# Patient Record
Sex: FEMALE | Race: WHITE | URBAN - METROPOLITAN AREA
[De-identification: names, ages, dates, MRNs, and addresses within clinical notes are randomized per-mention and may not be internally consistent; named-entity substitution may affect disease eponyms.]

---

## 2017-11-13 ENCOUNTER — OFFICE VISIT (OUTPATIENT)
Dept: FAMILY MEDICINE CLINIC | Age: 22
End: 2017-11-13

## 2017-11-13 VITALS
HEART RATE: 77 BPM | WEIGHT: 150.2 LBS | RESPIRATION RATE: 18 BRPM | TEMPERATURE: 97.2 F | SYSTOLIC BLOOD PRESSURE: 119 MMHG | OXYGEN SATURATION: 99 % | DIASTOLIC BLOOD PRESSURE: 76 MMHG | BODY MASS INDEX: 25.64 KG/M2 | HEIGHT: 64 IN

## 2017-11-13 DIAGNOSIS — Z23 ENCOUNTER FOR IMMUNIZATION: ICD-10-CM

## 2017-11-13 DIAGNOSIS — Z32.00 ENCOUNTER FOR PREGNANCY TEST, RESULT UNKNOWN: Primary | ICD-10-CM

## 2017-11-13 DIAGNOSIS — J45.40 MODERATE PERSISTENT ASTHMA WITHOUT COMPLICATION: ICD-10-CM

## 2017-11-13 DIAGNOSIS — Z30.011 BCP (BIRTH CONTROL PILLS) INITIATION: ICD-10-CM

## 2017-11-13 PROBLEM — J45.909 MODERATE ASTHMA WITHOUT COMPLICATION: Status: ACTIVE | Noted: 2017-11-13

## 2017-11-13 LAB
HCG URINE, QL. (POC): NEGATIVE
VALID INTERNAL CONTROL?: YES

## 2017-11-13 RX ORDER — NORGESTIMATE AND ETHINYL ESTRADIOL 7DAYSX3 28
KIT ORAL
COMMUNITY
End: 2017-12-19 | Stop reason: SDUPTHER

## 2017-11-13 RX ORDER — ALBUTEROL SULFATE 90 UG/1
1 AEROSOL, METERED RESPIRATORY (INHALATION)
Qty: 1 INHALER | Refills: 2 | Status: SHIPPED | OUTPATIENT
Start: 2017-11-13

## 2017-11-13 RX ORDER — ALBUTEROL SULFATE 90 UG/1
AEROSOL, METERED RESPIRATORY (INHALATION)
COMMUNITY
End: 2017-11-13 | Stop reason: SDUPTHER

## 2017-11-13 NOTE — PROGRESS NOTES
1. Have you been to the ER, urgent care clinic since your last visit? Hospitalized since your last visit? No    2. Have you seen or consulted any other health care providers outside of the [address] [place] [address] since your last visit? Include any pap smears or colon screening. No     Patient here today to get a refill on her medication.

## 2017-11-13 NOTE — PROGRESS NOTES
Chief Complaint   Patient presents with    Establish Care       HPI:  Pt is a 25y.o. year old female who presents to establish care with a primary care provider.      ROS:  · General: negative for - chills, fever, weight changes or malaise Positive:  · Skin, hair, nails:  negative for rashes, lumps, sores, itching, dryness, color changes, changes in hair or nails,easy bruising or bleeding Positive:  · Head:  negative for head trauma, headaches, vertigo or confusion Positive:  · Eyes:  negative for pain, redness, excessive tearing diplopia and blurred vision, Positive:  corrective lenses  · Ears:  negative for pain, hearing changes, infection, discharge Positive:  · Nose/throat:  negative for sore throat, nasal congestion, bleeding gums, sore tongue, dry mouth, vocal changes, neck lumps, neck pain or stiffness Positive:  · Respiratory:  negative for cough, shortness of breath, or wheezing, denies hemoptysis, pleuritic pain, changes in stamina Positive: hx of asthma  · Cardiovascular:  negative for chest pain, palpitations, or dyspnea on exertion; no orthopnea or PND, edema Positive:  · Gastrointestinal: negative for abdominal pain, N/V, change in bowel habits,  black or bloody stools, denies changes in appetitie, jaundice, heartburn Positive:  · Musculoskeletal: negative for back pain, joint pain, joint stiffness, muscle pain or muscle weakness Positive:  · Neurological:  negative for numbness, tingling, headache or dizziness, seizures, tremors, involuntary movements, memory loss Positive:  · Psychological: negative for - anxiety, depression, sleep disturbances, suicidal or homicidal ideations Positive:  · Urinary:  negative for frequency, urgency, dysuria, hematuria, incontinence, nocturia Positive:  · Endocrine:  negative for heat or cold intolerance, excessive sweating, polydipsia, polyphagia, polyuria Positive:  · Female reproductive: denies problems      Patient Active Problem List   Diagnosis Code    Moderate asthma without complication Z57.830    Encounter for immunization Z23       Past Medical History:   Diagnosis Date    Asthma           Social History     Social History    Marital status: SINGLE     Spouse name: N/A    Number of children: N/A    Years of education: N/A     Occupational History    Not on file. Social History Main Topics    Smoking status: Never Smoker    Smokeless tobacco: Never Used    Alcohol use 3.6 oz/week     3 Glasses of wine, 3 Shots of liquor per week    Drug use: No    Sexual activity: Yes     Partners: Male     Birth control/ protection: Pill, Condom     Other Topics Concern    Not on file     Social History Narrative    No narrative on file       Current Outpatient Prescriptions   Medication Sig Dispense Refill    norgestimate-ethinyl estradiol (TRINESSA, 28,) 0.18/0.215/0.25 mg-35 mcg (28) tab Take  by mouth.  albuterol (PROVENTIL HFA, VENTOLIN HFA, PROAIR HFA) 90 mcg/actuation inhaler Take 1 Puff by inhalation every four (4) hours as needed for Wheezing. 1 Inhaler 2       History   Smoking Status    Never Smoker   Smokeless Tobacco    Never Used       Allergies   Allergen Reactions    Other Food Angioedema     mesquite       Patient Labs were reviewed: n/a         Patient Past Records were reviewed:  yes        Objective:     Vitals:    11/13/17 1124   BP: 119/76   Pulse: 77   Resp: 18   Temp: 97.2 °F (36.2 °C)   TempSrc: Oral   SpO2: 99%   Weight: 150 lb 3.2 oz (68.1 kg)   Height: 5' 4\" (1.626 m)        Body mass index is 25.78 kg/(m^2). Exam:   Appearance: alert, well appearing,  oriented to person, place, and time, acyanotic, in no respiratory distress and well hydrated, neatly groomed, dressed appropriately for situation and weather, credible source of information, no  used  Head: Normocephalic with no masses, protrusions, depressions, lesions, itching, scaling. Facial features symmetric. No drooping, weakness or involuntary movements.  Skin texture smooth. No edema. No palpable nodes  Neck: Symmetrical accessory muscles, trachea midline, head held erect. Thyroid without masses, no cervical lymphadenopathy, no JVD, no carotid bruit  Eye:  no nystagmus or lid lag, no drainage, no redness, no masses,  Anicteric sclera , no ptosis, pink conjunctiva, PERRLA    Ear:  exhibited no signs of hearing loss, bilateral auricles equal in size and symmetric, no redness or drainage, Otoscopic examination unremarkable  Nose:  Nose midline and proportionate to pt facial features. No lésions or skin changes, no drainage  Mouth: no halitosis, mucous membranes pink and moist, no sores or lesions  Cardiac:  no cyanosis or venous congestion, no murmurs, rubs or gallops, no peripheral edema, RRR, capillary refill <= 3 seconds  Lungs:  lungs clear bilaterally all lobes, no SOB, chest rise symmetric, good air exchange, no nail bed clubbing  Abdomen: abd soft,  non-tender upon palpation to all quads, no hernias, no masses or deformities, positive bowel sounds, no hepatosplenomegaly   Skin: dry, warm, intact, no palor, acyanotic, color consistent to ethnic background, no jaundice  M/S: uninhibited ROM to all joints, no crepitus  Neuro: no lateralizing signs, CNs II-XII intact      Assessment/ Plan:   Diagnoses and all orders for this visit:    1. Encounter for pregnancy test, result unknown  -     AMB POC URINE PREGNANCY TEST, VISUAL COLOR COMPARISON    2. Moderate persistent asthma without complication  -     albuterol (PROVENTIL HFA, VENTOLIN HFA, PROAIR HFA) 90 mcg/actuation inhaler; Take 1 Puff by inhalation every four (4) hours as needed for Wheezing. 3. Encounter for immunization  -     Influenza virus vaccine (QUADRIVALENT PRES FREE SYRINGE) IM (21376)    4. BCP (birth control pills) initiation  -     [facility]      Follow-up Disposition:  Return if symptoms worsen or fail to improve.         I have discussed the diagnosis with the patient and the intended plan as seen in the above orders. The patient has received an After-Visit Summary and questions were answered concerning future plans. Medication Side Effects and Warnings were discussed with patient: n/a    Patient verbalized understanding of above instructions.     José Luis Blount [facility]  Internal Medicine  [facility]

## 2017-11-13 NOTE — PATIENT INSTRUCTIONS
Vaccine Information Statement    Influenza (Flu) Vaccine (Inactivated or Recombinant): What you need to know    Many Vaccine Information Statements are available in [place]ian and other languages. See www.immunize.org/vis  Hojas de Información Sobre Vacunas están disponibles en Español y en muchos otros idiomas. Visite www.immunize.org/vis    1. Why get vaccinated? Influenza (flu) is a contagious disease that spreads around the [place] every year, usually between October and May. Flu is caused by influenza viruses, and is spread mainly by coughing, sneezing, and close contact. Anyone can get flu. Flu strikes suddenly and can last several days. Symptoms vary by age, but can include:   fever/chills   sore throat   muscle aches   fatigue   cough   headache    runny or stuffy nose    Flu can also lead to pneumonia and blood infections, and cause diarrhea and seizures in children. If you have a medical condition, such as heart or lung disease, flu can make it worse. Flu is more dangerous for some people. Infants and young children, people 72years of age and older, pregnant women, and people with certain health conditions or a weakened immune system are at greatest risk. Each year thousands of people in the [facility] die from flu, and many more are hospitalized. Flu vaccine can:   keep you from getting flu,   make flu less severe if you do get it, and   keep you from spreading flu to your family and other people. 2. Inactivated and recombinant flu vaccines    A dose of flu vaccine is recommended every flu season. Children 6 months through 6years of age may need two doses during the same flu season. Everyone else needs only one dose each flu season.        Some inactivated flu vaccines contain a very small amount of a mercury-based preservative called thimerosal. Studies have not shown thimerosal in vaccines to be harmful, but flu vaccines that do not contain thimerosal are available. There is no live flu virus in flu shots. They cannot cause the flu. There are many flu viruses, and they are always changing. Each year a new flu vaccine is made to protect against three or four viruses that are likely to cause disease in the upcoming flu season. But even when the vaccine doesnt exactly match these viruses, it may still provide some protection    Flu vaccine cannot prevent:   flu that is caused by a virus not covered by the vaccine, or   illnesses that look like flu but are not. It takes about 2 weeks for protection to develop after vaccination, and protection lasts through the flu season. 3. Some people should not get this vaccine    Tell the person who is giving you the vaccine:     If you have any severe, life-threatening allergies. If you ever had a life-threatening allergic reaction after a dose of flu vaccine, or have a severe allergy to any part of this vaccine, you may be advised not to get vaccinated. Most, but not all, types of flu vaccine contain a small amount of egg protein.  If you ever had Guillain-Barré Syndrome (also called GBS). Some people with a history of GBS should not get this vaccine. This should be discussed with your doctor.  If you are not feeling well. It is usually okay to get flu vaccine when you have a mild illness, but you might be asked to come back when you feel better. 4. Risks of a vaccine reaction    With any medicine, including vaccines, there is a chance of reactions. These are usually mild and go away on their own, but serious reactions are also possible. Most people who get a flu shot do not have any problems with it.      Minor problems following a flu shot include:    soreness, redness, or swelling where the shot was given     hoarseness   sore, red or itchy eyes   cough   fever   aches   headache   itching   fatigue  If these problems occur, they usually begin soon after the shot and last 1 or 2 days. More serious problems following a flu shot can include the following:     There may be a small increased risk of Guillain-Barré Syndrome (GBS) after inactivated flu vaccine. This risk has been estimated at 1 or 2 additional cases per million people vaccinated. This is much lower than the risk of severe complications from flu, which can be prevented by flu vaccine.  Young children who get the flu shot along with pneumococcal vaccine (PCV13) and/or DTaP vaccine at the same time might be slightly more likely to have a seizure caused by fever. Ask your doctor for more information. Tell your doctor if a child who is getting flu vaccine has ever had a seizure. Problems that could happen after any injected vaccine:      People sometimes faint after a medical procedure, including vaccination. Sitting or lying down for about 15 minutes can help prevent fainting, and injuries caused by a fall. Tell your doctor if you feel dizzy, or have vision changes or ringing in the ears.  Some people get severe pain in the shoulder and have difficulty moving the arm where a shot was given. This happens very rarely.  Any medication can cause a severe allergic reaction. Such reactions from a vaccine are very rare, estimated at about 1 in a million doses, and would happen within a few minutes to a few hours after the vaccination. As with any medicine, there is a very remote chance of a vaccine causing a serious injury or death. The safety of vaccines is always being monitored. For more information, visit: www.cdc.gov/vaccinesafety/    5. What if there is a serious reaction? What should I look for?  Look for anything that concerns you, such as signs of a severe allergic reaction, very high fever, or unusual behavior.     Signs of a severe allergic reaction can include hives, swelling of the face and throat, difficulty breathing, a fast heartbeat, dizziness, and weakness - usually within a few minutes to a few hours after the vaccination. What should I do?  If you think it is a severe allergic reaction or other emergency that cant wait, call 9-1-1 and get the person to the nearest hospital. Otherwise, call your doctor.  Reactions should be reported to the Vaccine Adverse Event Reporting System (VAERS). Your doctor should file this report, or you can do it yourself through  the VAERS web site at www.vaers. [facility].gov, or by calling 3-725.976.8832. VAERS does not give medical advice. 6. The National Vaccine Injury Compensation Program    The [facility] Vaccine Injury Compensation Program (VICP) is a federal program that was created to compensate people who may have been injured by certain vaccines. Persons who believe they may have been injured by a vaccine can learn about the program and about filing a claim by calling 8-587.134.6137 or visiting the [employer] website at www.[facility].gov/vaccinecompensation. There is a time limit to file a claim for compensation. 7. How can I learn more?  Ask your healthcare provider. He or she can give you the vaccine package insert or suggest other sources of information.  Call your local or state health department.  Contact the Centers for Disease Control and Prevention (CDC):  - Call 9-904.870.5665 (1-800-CDC-INFO) or  - Visit CDCs website at www.cdc.gov/flu    Vaccine Information Statement   Inactivated Influenza Vaccine   8/7/2015  [address] Escobar [id number]    Department of Health and Human Services  Centers for Disease Control and Prevention    Office Use Only

## 2017-11-13 NOTE — MR AVS SNAPSHOT
Visit Information Date & Time Provider Department Dept. Phone Encounter #  
 11/13/2017 11:00 AM Magdalena Sapp, [facility] [phone] [id number] Follow-up Instructions Return if symptoms worsen or fail to improve. Your Appointments 11/30/2017  1:00 PM  
New Patient with Carmella Santos MD  
[address]  ([address]) Appt Note: NP - consultation with birth control id ins card 30 mins early rb 11/13/17  
 [address] [address]  
[phone]  
  
   
 [address] Upcoming Health Maintenance Date Due  
 HPV AGE 9Y-34Y (1 of 3 - Female 3 Dose Series) 6/19/2006 DTaP/Tdap/Td series (1 - Tdap) 6/19/2016 PAP AKA CERVICAL CYTOLOGY 6/19/2016 Influenza Age 5 to Adult 8/1/2017 Allergies as of 11/13/2017  Review Complete On: 11/13/2017 By: Magdalena Sapp NP Severity Noted Reaction Type Reactions Other Food High 11/13/2017    Angioedema  
 mesquite Current Immunizations  Never Reviewed Name Date Influenza Vaccine (Quad) PF  Incomplete Not reviewed this visit You Were Diagnosed With   
  
 Codes Comments Encounter for pregnancy test, result unknown    -  Primary ICD-10-CM: Z32.00 ICD-9-CM: V72.40 Moderate persistent asthma without complication     TFX-37-OK: J45.40 ICD-9-CM: 493.90 Encounter for immunization     ICD-10-CM: R68 ICD-9-CM: V03.89 Vitals BP Pulse Temp Resp Height(growth percentile) Weight(growth percentile) 119/76 77 97.2 °F (36.2 °C) (Oral) 18 5' 4\" (1.626 m) 150 lb 3.2 oz (68.1 kg) LMP SpO2 BMI Smoking Status 10/13/2017 99% 25.78 kg/m2 Never Smoker Vitals History BMI and BSA Data Body Mass Index Body Surface Area 25.78 kg/m 2 1.75 m 2 Preferred Pharmacy Pharmacy Name Phone  [id number] [place], [place] - [address] [phone] Your Updated Medication List  
  
   
This list is accurate as of: 11/13/17 12:13 PM.  Always use your most recent med list.  
  
  
  
  
 albuterol 90 mcg/actuation inhaler Commonly known as:  PROVENTIL HFA, VENTOLIN HFA, PROAIR HFA Take 1 Puff by inhalation every four (4) hours as needed for Wheezing. TRINESSA (28) 0.18/0.215/0.25 mg-35 mcg (28) Tab Generic drug:  norgestimate-ethinyl estradiol Take  by mouth. Prescriptions Sent to Pharmacy Refills  
 albuterol (PROVENTIL HFA, VENTOLIN HFA, PROAIR HFA) 90 mcg/actuation inhaler 2 Sig: Take 1 Puff by inhalation every four (4) hours as needed for Wheezing. Class: Normal  
 Pharmacy: [facility]/pharmacy #[id number].  #: [phone] Route: Inhalation We Performed the Following AMB POC URINE PREGNANCY TEST, VISUAL COLOR COMPARISON [66664 CPT(R)] INFLUENZA VIRUS VAC QUAD,SPLIT,PRESV FREE SYRINGE IM [id number] CPT(R)] Follow-up Instructions Return if symptoms worsen or fail to improve. Patient Instructions Vaccine Information Statement Influenza (Flu) Vaccine (Inactivated or Recombinant): What you need to know Many Vaccine Information Statements are available in Lithuanian and other languages. See www.immunize.org/vis Hojas de Información Sobre Vacunas están disponibles en Español y en muchos otros idiomas. Visite www.immunize.org/vis 1. Why get vaccinated? Influenza (flu) is a contagious disease that spreads around the [place] every year, usually between October and May. Flu is caused by influenza viruses, and is spread mainly by coughing, sneezing, and close contact. Anyone can get flu. Flu strikes suddenly and can last several days. Symptoms vary by age, but can include: 
 fever/chills  sore throat  muscle aches  fatigue  cough  headache  runny or stuffy nose Flu can also lead to pneumonia and blood infections, and cause diarrhea and seizures in children. If you have a medical condition, such as heart or lung disease, flu can make it worse. Flu is more dangerous for some people. Infants and young children, people 72years of age and older, pregnant women, and people with certain health conditions or a weakened immune system are at greatest risk. Each year thousands of people in the [facility] die from flu, and many more are hospitalized. Flu vaccine can: 
 keep you from getting flu, 
 make flu less severe if you do get it, and 
 keep you from spreading flu to your family and other people. 2. Inactivated and recombinant flu vaccines A dose of flu vaccine is recommended every flu season. Children 6 months through 6years of age may need two doses during the same flu season. Everyone else needs only one dose each flu season. Some inactivated flu vaccines contain a very small amount of a mercury-based preservative called thimerosal. Studies have not shown thimerosal in vaccines to be harmful, but flu vaccines that do not contain thimerosal are available. There is no live flu virus in flu shots. They cannot cause the flu. There are many flu viruses, and they are always changing. Each year a new flu vaccine is made to protect against three or four viruses that are likely to cause disease in the upcoming flu season. But even when the vaccine doesnt exactly match these viruses, it may still provide some protection Flu vaccine cannot prevent: 
 flu that is caused by a virus not covered by the vaccine, or 
 illnesses that look like flu but are not. It takes about 2 weeks for protection to develop after vaccination, and protection lasts through the flu season. 3. Some people should not get this vaccine Tell the person who is giving you the vaccine:  If you have any severe, life-threatening allergies. If you ever had a life-threatening allergic reaction after a dose of flu vaccine, or have a severe allergy to any part of this vaccine, you may be advised not to get vaccinated. Most, but not all, types of flu vaccine contain a small amount of egg protein.  If you ever had Guillain-Barré Syndrome (also called GBS). Some people with a history of GBS should not get this vaccine. This should be discussed with your doctor.  If you are not feeling well. It is usually okay to get flu vaccine when you have a mild illness, but you might be asked to come back when you feel better. 4. Risks of a vaccine reaction With any medicine, including vaccines, there is a chance of reactions. These are usually mild and go away on their own, but serious reactions are also possible. Most people who get a flu shot do not have any problems with it. Minor problems following a flu shot include:  
 soreness, redness, or swelling where the shot was given  hoarseness  sore, red or itchy eyes  cough  fever  aches  headache  itching  fatigue If these problems occur, they usually begin soon after the shot and last 1 or 2 days. More serious problems following a flu shot can include the following:  There may be a small increased risk of Guillain-Barré Syndrome (GBS) after inactivated flu vaccine. This risk has been estimated at 1 or 2 additional cases per million people vaccinated. This is much lower than the risk of severe complications from flu, which can be prevented by flu vaccine.  Young children who get the flu shot along with pneumococcal vaccine (PCV13) and/or DTaP vaccine at the same time might be slightly more likely to have a seizure caused by fever. Ask your doctor for more information. Tell your doctor if a child who is getting flu vaccine has ever had a seizure. Problems that could happen after any injected vaccine:  People sometimes faint after a medical procedure, including vaccination. Sitting or lying down for about 15 minutes can help prevent fainting, and injuries caused by a fall. Tell your doctor if you feel dizzy, or have vision changes or ringing in the ears.  Some people get severe pain in the shoulder and have difficulty moving the arm where a shot was given. This happens very rarely.  Any medication can cause a severe allergic reaction. Such reactions from a vaccine are very rare, estimated at about 1 in a million doses, and would happen within a few minutes to a few hours after the vaccination. As with any medicine, there is a very remote chance of a vaccine causing a serious injury or death. The safety of vaccines is always being monitored. For more information, visit: www.cdc.gov/vaccinesafety/ 
 
 
The [facility] Vaccine Injury Compensation Program (VICP) is a federal program that was created to compensate people who may have been injured by certain vaccines. Persons who believe they may have been injured by a vaccine can learn about the program and about filing a claim by calling 8-226.334.7223 or visiting the [employer] website at www.[facility].gov/vaccinecompensation. There is a time limit to file a claim for compensation. 7. How can I learn more?  Ask your healthcare provider. He or she can give you the vaccine package insert or suggest other sources of information.  Call your local or state health department.  Contact the Centers for Disease Control and Prevention (CDC): 
- Call 4-430.495.2425 (1-800-CDC-INFO) or 
- Visit CDCs website at www.cdc.gov/flu Vaccine Information Statement Inactivated Influenza Vaccine 8/7/2015 
42 ARCADIO Kramer 878DA-11 [facility] and [place] Centers for Disease Control and Prevention Office Use Only Introducing [facility]! [employer] introduces [employer] patient portal. Now you can access parts of your medical record, email your doctor's office, and request medication refills online. 1. In your internet browser, go to [email or web address] 2. Click on the First Time User? Click Here link in the Sign In box. You will see the New Member Sign Up page. 3. Enter your [employer] Access Code exactly as it appears below. You will not need to use this code after youve completed the sign-up process. If you do not sign up before the expiration date, you must request a new code. · [employer] Access Code: [email or web address] Expires: 2/11/2018 11:58 AM 
 
4. Enter the last four digits of your Social Security Number (xxxx) and Date of Birth (mm/dd/yyyy) as indicated and click Submit. You will be taken to the next sign-up page. 5. Create a Comuniteet ID. This will be your [employer] login ID and cannot be changed, so think of one that is secure and easy to remember. 6. Create a Com[employer] password. You can change your password at any time. 7. Enter your Password Reset Question and Answer. This can be used at a later time if you forget your password. 8. Enter your e-mail address. You will receive e-mail notification when new information is available in [address] 9. Click Sign Up. You can now view and download portions of your medical record. 10. Click the Download Summary menu link to download a portable copy of your medical information. If you have questions, please visit the Frequently Asked Questions section of the [employer] website. Remember, [employer] is NOT to be used for urgent needs. For medical emergencies, dial 911. Now available from your iPhone and Android! Please provide this summary of care documentation to your next provider. Your primary care clinician is listed as Lico Hurst. If you have any questions after today's visit, please call [phone].

## 2017-12-19 ENCOUNTER — OFFICE VISIT (OUTPATIENT)
Dept: OBGYN CLINIC | Age: 22
End: 2017-12-19

## 2017-12-19 ENCOUNTER — HOSPITAL ENCOUNTER (OUTPATIENT)
Dept: LAB | Age: 22
Discharge: HOME | End: 2017-12-19

## 2017-12-19 VITALS — HEIGHT: 64 IN | WEIGHT: 151.8 LBS | BODY MASS INDEX: 25.92 KG/M2

## 2017-12-19 DIAGNOSIS — N89.8 VAGINAL DISCHARGE: Primary | ICD-10-CM

## 2017-12-19 DIAGNOSIS — Z11.3 SCREEN FOR STD (SEXUALLY TRANSMITTED DISEASE): ICD-10-CM

## 2017-12-19 DIAGNOSIS — Z30.41 ENCOUNTER FOR SURVEILLANCE OF CONTRACEPTIVE PILLS: ICD-10-CM

## 2017-12-19 DIAGNOSIS — N76.0 BACTERIAL VAGINOSIS: ICD-10-CM

## 2017-12-19 DIAGNOSIS — B96.89 BACTERIAL VAGINOSIS: ICD-10-CM

## 2017-12-19 PROCEDURE — 99001 SPECIMEN HANDLING PT-LAB: CPT | Performed by: OBSTETRICS & GYNECOLOGY

## 2017-12-19 RX ORDER — NORGESTIMATE AND ETHINYL ESTRADIOL 7DAYSX3 28
1 KIT ORAL DAILY
Qty: 1 PACKAGE | Refills: 12 | Status: SHIPPED | OUTPATIENT
Start: 2017-12-19

## 2017-12-19 RX ORDER — METRONIDAZOLE 500 MG/1
500 TABLET ORAL 2 TIMES DAILY
Qty: 14 TAB | Refills: 0 | Status: SHIPPED | OUTPATIENT
Start: 2017-12-19 | End: 2017-12-26

## 2017-12-19 NOTE — PROGRESS NOTES
Subjective:      Chuy  is a 25 y.o. female who complains of vag d/c w odor x 2 wks, no vag itching/burning, amt of discharge increasing over time, has no alleviating or worsening factors; denies n/v/f/c; desires std screening    Sexual history: single partner, contraception - OCP (Oral Contraceptive Pills). Prior Pap smear: earlier this year, wnl.    OB History      Para Term  AB Living    0 0 0 0 0 0    SAB TAB Ectopic Molar Multiple Live Births    0 0 0 0 0 0        Patient Active Problem List   Diagnosis Code    Moderate asthma without complication O39.841    Encounter for immunization Z23     Current Outpatient Prescriptions   Medication Sig Dispense Refill    metroNIDAZOLE (FLAGYL) 500 mg tablet Take 1 Tab by mouth two (2) times a day for 7 days. 14 Tab 0    norgestimate-ethinyl estradiol (TRINESSA, 28,) 0.18/0.215/0.25 mg-35 mcg (28) tab Take 1 Tab by mouth daily. Indications: Pregnancy Contraception 1 Package 12    albuterol (PROVENTIL HFA, VENTOLIN HFA, PROAIR HFA) 90 mcg/actuation inhaler Take 1 Puff by inhalation every four (4) hours as needed for Wheezing. 1 Inhaler 2     Allergies   Allergen Reactions    Other Food Angioedema     mesquite     Past Medical History:   Diagnosis Date    Asthma      History reviewed. No pertinent surgical history.   Family History   Problem Relation Age of Onset    Hypertension Mother     High Cholesterol Father     Diabetes Maternal Grandmother     Cancer Other 80     ovarian and breast     Social History   Substance Use Topics    Smoking status: Never Smoker    Smokeless tobacco: Never Used    Alcohol use 3.6 oz/week     3 Glasses of wine, 3 Shots of liquor per week        Review of Systems:   General ROS: negative for fever, chills, malaise  Respiratory ROS: no cough, shortness of breath, or wheezing  Cardiovascular ROS: no chest pain or dyspnea on exertion  Gastrointestinal ROS: no abdominal pain, change in bowel habits, or black or bloody stools, nausea, vomiting  Genito-Urinary ROS: no dysuria, trouble voiding, incontinence or hematuria. No pelvic pain, abnormal bleeding     Objective:     Visit Vitals    Ht 5' 4\" (1.626 m)    Wt 68.9 kg (151 lb 12.8 oz)    LMP 12/03/2017 (Exact Date)    BMI 26.06 kg/m2      Physical Exam:   General appearance - alert, well appearing, and in no distress, oriented to person, place, and time  Abdomen - soft, nontender, nondistended, no masses or organomegaly  Pelvic - No inguinal lymphadenopathy, normal urethral meatus and no bladder tenderness. VULVA: normal appearing vulva with no masses, tenderness or lesions,   VAGINA: normal appearing vagina with normal color, no lesions, scant white d/c   PELVIC FLOOR EXAM: no cystocele, rectocele or prolapse noted  CERVIX: normal appearing cervix without discharge or lesions,   UTERUS: uterus is normal size, shape, consistency and nontender, mobile,   ADNEXA: normal adnexa in size, nontender and no masses. Wet prep: +clue cells, no trich, no yeast    Assessment/Plan:       ICD-10-CM ICD-9-CM    1. Vaginal discharge N89.8 623.5 AMB POC SMEAR, STAIN & INTERPRET, WET MOUNT      CHLAMYDIA/NEISSERIA/TRICHOMONAS AMP   2. Bacterial vaginosis N76.0 616.10 metroNIDAZOLE (FLAGYL) 500 mg tablet    B96.89 041. 9 CHLAMYDIA/NEISSERIA/TRICHOMONAS AMP   3. Screen for STD (sexually transmitted disease) Z11.3 V74.5 T PALLIDUM AB      HEP B SURFACE AG      HEPATITIS C AB      HIV 1/2 AG/AB, 4TH GENERATION,W RFLX CONFIRM      T PALLIDUM AB      HEP B SURFACE AG      HEPATITIS C AB      HIV 1/2 AG/AB, 4TH GENERATION,W RFLX CONFIRM      CHLAMYDIA/NEISSERIA/TRICHOMONAS AMP   4.  Encounter for surveillance of contraceptive pills Z30.41 V25.41 norgestimate-ethinyl estradiol (TRINESSA, 28,) 0.18/0.215/0.25 mg-35 mcg (28) tab      CHLAMYDIA/NEISSERIA/TRICHOMONAS AMP     lab results and schedule of future lab studies reviewed with patient     Discussed with patient that our office will call for abnormal lab results. Normal results can be reviewed through SeeMe. If patient has not received a phone call from our office or there are no results found on Mychart, the patient has been instructed to call our office to follow up on results. I have verbalized the plan of care with patient. The patient was given a full opportunity to ask questions, indicated that her questions had been answered and expressed understanding.

## 2017-12-21 LAB
C TRACH RRNA SPEC QL NAA+PROBE: NEGATIVE
HBV SURFACE AG SERPL QL IA: NEGATIVE
HCV AB S/CO SERPL IA: 0.2 S/CO RATIO (ref 0–0.9)
HIV 1+2 AB+HIV1 P24 AG SERPL QL IA: NON REACTIVE
N GONORRHOEA RRNA SPEC QL NAA+PROBE: NEGATIVE
T PALLIDUM AB SER QL IA: NEGATIVE
T VAGINALIS RRNA SPEC QL NAA+PROBE: NEGATIVE

## 2017-12-29 LAB — WET MOUNT POCT, WMPOCT: NORMAL

## 2018-03-16 ENCOUNTER — TELEPHONE (OUTPATIENT)
Dept: OBGYN CLINIC | Age: 23
End: 2018-03-16

## 2018-03-21 ENCOUNTER — OFFICE VISIT (OUTPATIENT)
Dept: OBGYN CLINIC | Age: 23
End: 2018-03-21

## 2018-03-21 VITALS
HEIGHT: 64 IN | WEIGHT: 153 LBS | HEART RATE: 81 BPM | SYSTOLIC BLOOD PRESSURE: 114 MMHG | DIASTOLIC BLOOD PRESSURE: 74 MMHG | BODY MASS INDEX: 26.12 KG/M2

## 2018-03-21 DIAGNOSIS — N76.0 BV (BACTERIAL VAGINOSIS): ICD-10-CM

## 2018-03-21 DIAGNOSIS — B96.89 BV (BACTERIAL VAGINOSIS): ICD-10-CM

## 2018-03-21 DIAGNOSIS — N89.8 VAGINAL DISCHARGE: Primary | ICD-10-CM

## 2018-03-21 RX ORDER — METRONIDAZOLE 500 MG/1
500 TABLET ORAL 2 TIMES DAILY
Qty: 14 TAB | Refills: 0 | Status: SHIPPED | OUTPATIENT
Start: 2018-03-21 | End: 2018-03-28

## 2018-03-21 NOTE — MR AVS SNAPSHOT
[address] [place] [id number] [phone] Patient: Guerrero Thurston MRN: [id number] Visit Information Date & Time Provider Department Dept. Phone Encounter #  
 3/21/2018  1:45 PM Perry Arellano Osito [facility]  Follow-up Instructions Return if symptoms worsen or fail to improve. Upcoming Health Maintenance Date Due  
 HPV AGE 9Y-34Y (1 of 3 - Female 3 Dose Series) 2006 PAP AKA CERVICAL CYTOLOGY 2016 Allergies as of 3/21/2018  Review Complete On: 3/21/2018 By: Andie Boudreaux LPN Severity Noted Reaction Type Reactions Other Food High 2017    Angioedema  
 mesquite Current Immunizations  Never Reviewed Name Date Influenza Vaccine (Quad) PF 2017 12:44 PM  
  
 Not reviewed this visit You Were Diagnosed With   
  
 Codes Comments Vaginal discharge    -  Primary ICD-10-CM: N89.8 ICD-9-CM: 623.5 BV (bacterial vaginosis)     ICD-10-CM: N76.0, B96.89 
ICD-9-CM: 616.10, 041.9 Vitals BP Pulse Height(growth percentile) Weight(growth percentile) LMP BMI  
 114/74 81 5' 4\" (1.626 m) 153 lb (69.4 kg) 2018 26.26 kg/m2 OB Status Smoking Status Having regular periods Never Smoker Vitals History BMI and BSA Data Body Mass Index Body Surface Area  
 26.26 kg/m 2 1.77 m 2 Preferred Pharmacy [facility] Phone [facility] [address] [place] [address] [phone]. [phone] Your Updated Medication List  
  
   
This list is accurate as of 3/21/18  2:24 PM.  Always use your most recent med list.  
  
  
  
  
 albuterol 90 mcg/actuation inhaler Commonly known as:  PROVENTIL HFA, VENTOLIN HFA, PROAIR HFA Take 1 Puff by inhalation every four (4) hours as needed for Wheezing. metroNIDAZOLE 500 mg tablet Commonly known as:  FLAGYL Take 1 Tab by mouth two (2) times a day for 7 days. norgestimate-ethinyl estradiol 0.18/0.215/0.25 mg-35 mcg (28) Tab Commonly known as:  TRINESSA (28) Take 1 Tab by mouth daily. Indications: Pregnancy Contraception Prescriptions Sent to Pharmacy Refills  
 metroNIDAZOLE (FLAGYL) 500 mg tablet 0 Sig: Take 1 Tab by mouth two (2) times a day for 7 days. Class: Normal  
 Pharmacy: [facility]/pharmacy #[id number].  #: [phone] Route: Oral  
  
Follow-up Instructions Return if symptoms worsen or fail to improve. Introducing [facility]! Adelia Montiel introduces [employer] patient portal. Now you can access parts of your medical record, email your doctor's office, and request medication refills online. 1. In your internet browser, go to [email or web address] 2. Click on the First Time User? Click Here link in the Sign In box. You will see the New Member Sign Up page. 3. Enter your [employer] Access Code exactly as it appears below. You will not need to use this code after youve completed the sign-up process. If you do not sign up before the expiration date, you must request a new code. · [employer] Access Code: [email or web address] Expires: 6/19/2018  2:24 PM 
 
4. Enter the last four digits of your Social Security Number (xxxx) and Date of Birth (mm/dd/yyyy) as indicated and click Submit. You will be taken to the next sign-up page. 5. Create a Lemkot ID. This will be your [employer] login ID and cannot be changed, so think of one that is secure and easy to remember. 6. Create a J[employer]lu password. You can change your password at any time. 7. Enter your Password Reset Question and Answer. This can be used at a later time if you forget your password. 8. Enter your e-mail address. You will receive e-mail notification when new information is available in [address] 9. Click Sign Up. You can now view and download portions of your medical record. 10. Click the Download Summary menu link to download a portable copy of your medical information. If you have questions, please visit the Frequently Asked Questions section of the [employer] website. Remember, [employer] is NOT to be used for urgent needs. For medical emergencies, dial 911. Now available from your iPhone and Android! Please provide this summary of care documentation to your next provider. Your primary care clinician is listed as Constantine Ochoa. If you have any questions after today's visit, please call [phone].

## 2018-03-22 LAB
C TRACH RRNA SPEC QL NAA+PROBE: NEGATIVE
N GONORRHOEA RRNA SPEC QL NAA+PROBE: NEGATIVE
T VAGINALIS RRNA SPEC QL NAA+PROBE: NEGATIVE

## 2018-03-23 LAB — WET MOUNT POCT, WMPOCT: NORMAL

## 2018-03-23 NOTE — PROGRESS NOTES
Subjective:   25 y.o. complains of white vaginal discharge for 2 weeks. .  Denies abnormal vaginal bleeding or significant pelvic pain or  fever. No UTI symptoms. Denies history of known exposure to STD. Menstrual history:  She has been bleeding regularly on OCPs  Dysmenorrhea: none. Cyclic symptoms include none. She denies breast tenderness, bloating and fluid retention    Sexual history: single partner, contraception - OCP (Oral Contraceptive Pills). Prior Pap smear:was normal.    OB History      Para Term  AB Living    0 0 0 0 0 0    SAB TAB Ectopic Molar Multiple Live Births    0 0 0 0 0 0        Patient Active Problem List   Diagnosis Code    Moderate asthma without complication O34.953    Encounter for immunization Z23     Patient Active Problem List    Diagnosis Date Noted    Moderate asthma without complication     Encounter for immunization 2017     Current Outpatient Prescriptions   Medication Sig Dispense Refill    metroNIDAZOLE (FLAGYL) 500 mg tablet Take 1 Tab by mouth two (2) times a day for 7 days. 14 Tab 0    norgestimate-ethinyl estradiol (TRINESSA, 28,) 0.18/0.215/0.25 mg-35 mcg (28) tab Take 1 Tab by mouth daily. Indications: Pregnancy Contraception 1 Package 12    albuterol (PROVENTIL HFA, VENTOLIN HFA, PROAIR HFA) 90 mcg/actuation inhaler Take 1 Puff by inhalation every four (4) hours as needed for Wheezing. 1 Inhaler 2     Allergies   Allergen Reactions    Other Food Angioedema     mesquite     Past Medical History:   Diagnosis Date    Asthma      History reviewed. No pertinent surgical history.   Family History   Problem Relation Age of Onset    Hypertension Mother     High Cholesterol Father     Diabetes Maternal Grandmother     Cancer Other 80     ovarian and breast     Social History   Substance Use Topics    Smoking status: Never Smoker    Smokeless tobacco: Never Used    Alcohol use 3.6 oz/week     3 Glasses of wine, 3 Shots of liquor per week        Review of Systems    Constitutional: negative for fevers, chills, fatigue and malaise  Gastrointestinal: negative for nausea, vomiting, change in bowel habits, diarrhea, constipation and abdominal pain  Genitourinary:negative for frequency, urinary incontinence, decreased stream, hematuria and genital lesions, abnormal menstrual periods, sexual problems and hot flashes     Objective:     Visit Vitals    /74    Pulse 81    Ht 5' 4\" (1.626 m)    Wt 153 lb (69.4 kg)    LMP 02/22/2018    BMI 26.26 kg/m2      Physical Exam:   General appearance - alert, well appearing, and in no distress, oriented to person, place, and time  Abdomen - soft, nontender, nondistended, no masses or organomegaly  Pelvic -   VULVA: normal appearing vulva with no masses, tenderness or lesions  VAGINA: normal appearing vagina with normal color; no lesions; +discharge noted  CERVIX: normal appearing cervix discharge but no lesions, no cervical motion tenderness  UTERUS: uterus is normal size, shape, consistency and nontender  ADNEXA: normal adnexa in size, nontender and no masses     Microscopic wet-mount exam shows clue cells. .    Assessment/Plan:       bacterial vaginosis  GC and chlamydia DNA  probe sent to lab. Treatment: Flagyl 500 BID x 7 days and abstain from coitus during course of treatment  ROV prn if symptoms persist or worsen. ICD-10-CM ICD-9-CM    1. Vaginal discharge N89.8 623.5 CHLAMYDIA/NEISSERIA/TRICHOMONAS AMP      metroNIDAZOLE (FLAGYL) 500 mg tablet      AMB POC SMEAR, STAIN & INTERPRET, WET MOUNT   2. BV (bacterial vaginosis) N76.0 616.10 CHLAMYDIA/NEISSERIA/TRICHOMONAS AMP    B96.89 041.9 metroNIDAZOLE (FLAGYL) 500 mg tablet   . I have verbalized the plan of care with patient. The patient was given a full opportunity to ask questions, indicated that her questions had been answered and expressed understanding.

## 2018-03-23 NOTE — PATIENT INSTRUCTIONS
Bacterial Vaginosis: Care Instructions  Your Care Instructions    Bacterial vaginosis is a type of vaginal infection. It is caused by excess growth of certain bacteria that are normally found in the vagina. Symptoms can include itching, swelling, pain when you urinate or have sex, and a gray or yellow discharge with a \"fishy\" odor. It is not considered an infection that is spread through sexual contact. Although symptoms can be annoying and uncomfortable, bacterial vaginosis does not usually cause other health problems. However, if you have it while you are pregnant, it can cause complications. While the infection may go away on its own, most doctors use antibiotics to treat it. You may have been prescribed pills or vaginal cream. With treatment, bacterial vaginosis usually clears up in 5 to 7 days. Follow-up care is a key part of your treatment and safety. Be sure to make and go to all appointments, and call your doctor if you are having problems. It's also a good idea to know your test results and keep a list of the medicines you take. How can you care for yourself at home? · Take your antibiotics as directed. Do not stop taking them just because you feel better. You need to take the full course of antibiotics. · Do not eat or drink anything that contains alcohol if you are taking metronidazole (Flagyl). · Keep using your medicine if you start your period. Use pads instead of tampons while using a vaginal cream or suppository. Tampons can absorb the medicine. · Wear loose cotton clothing. Do not wear nylon and other materials that hold body heat and moisture close to the skin. · Do not scratch. Relieve itching with a cold pack or a cool bath. · Do not wash your vaginal area more than once a day. Use plain water or a mild, unscented soap. Do not douche. When should you call for help?   Watch closely for changes in your health, and be sure to contact your doctor if:  ? · You have unexpected vaginal bleeding. ? · You have a fever. ? · You have new or increased pain in your vagina or pelvis. ? · You are not getting better after 1 week. ? · Your symptoms return after you finish the course of your medicine. Where can you learn more? Go to http://damon-jordyn.info/. Milagro Prather in the search box to learn more about \"Bacterial Vaginosis: Care Instructions. \"  Current as of: October 13, 2016  Content Version: 11.4  © 8743-6685 [employer]. Care instructions adapted under license by [employer] (which disclaims liability or warranty for this information). If you have questions about a medical condition or this instruction, always ask your healthcare professional. [address] any warranty or liability for your use of this information.

## 2018-06-21 ENCOUNTER — OFFICE VISIT (OUTPATIENT)
Dept: OBGYN CLINIC | Age: 23
End: 2018-06-21

## 2018-06-21 VITALS
RESPIRATION RATE: 18 BRPM | HEIGHT: 64 IN | BODY MASS INDEX: 25.95 KG/M2 | SYSTOLIC BLOOD PRESSURE: 112 MMHG | WEIGHT: 152 LBS | DIASTOLIC BLOOD PRESSURE: 75 MMHG | HEART RATE: 77 BPM

## 2018-06-21 DIAGNOSIS — N76.0 BV (BACTERIAL VAGINOSIS): ICD-10-CM

## 2018-06-21 DIAGNOSIS — N89.8 VAGINAL DISCHARGE: Primary | ICD-10-CM

## 2018-06-21 DIAGNOSIS — B96.89 BV (BACTERIAL VAGINOSIS): ICD-10-CM

## 2018-06-21 LAB — WET MOUNT POCT, WMPOCT: NORMAL

## 2018-06-21 RX ORDER — METRONIDAZOLE 500 MG/1
500 TABLET ORAL 2 TIMES DAILY
Qty: 14 TAB | Refills: 0 | Status: SHIPPED | OUTPATIENT
Start: 2018-06-21 | End: 2018-06-28

## 2018-06-21 RX ORDER — METRONIDAZOLE 7.5 MG/G
GEL VAGINAL
Qty: 187.5 MG | Refills: 3 | Status: SHIPPED | OUTPATIENT
Start: 2018-06-21

## 2018-06-21 NOTE — PROGRESS NOTES
Subjective:      Patient complains of recurrent vaginal discharge with odor. She states that she was treated for BV 3 and 6 months ago, but that prior to 6 months ago she had no problems. She is sexually active and uses OCPs for contraception, and she states that she had a normal Pap in March 2017. She had a negative STD screen 3 months ago. Current Outpatient Prescriptions   Medication Sig Dispense Refill    metroNIDAZOLE (FLAGYL) 500 mg tablet Take 1 Tab by mouth two (2) times a day for 7 days. 14 Tab 0    metroNIDAZOLE (METROGEL) 0.75 % vaginal gel Insert one applicator into vagina once weekly for 6 months. 187.5 mg 3    norgestimate-ethinyl estradiol (TRINESSA, 28,) 0.18/0.215/0.25 mg-35 mcg (28) tab Take 1 Tab by mouth daily. Indications: Pregnancy Contraception 1 Package 12    albuterol (PROVENTIL HFA, VENTOLIN HFA, PROAIR HFA) 90 mcg/actuation inhaler Take 1 Puff by inhalation every four (4) hours as needed for Wheezing. 1 Inhaler 2     Allergies   Allergen Reactions    Other Food Angioedema     mesquite     Past Medical History:   Diagnosis Date    Asthma      No past surgical history on file. Family History   Problem Relation Age of Onset    Hypertension Mother     High Cholesterol Father     Diabetes Maternal Grandmother     Cancer Other 80     ovarian and breast     Social History   Substance Use Topics    Smoking status: Never Smoker    Smokeless tobacco: Never Used    Alcohol use 3.6 oz/week     3 Glasses of wine, 3 Shots of liquor per week      Review of Systems    A comprehensive review of systems was negative except for that written in the HPI.        Objective:     Visit Vitals    /75    Pulse 77    Resp 18    Ht 5' 4\" (1.626 m)    Wt 152 lb (68.9 kg)    LMP 06/14/2018    BMI 26.09 kg/m2     General appearance: alert, cooperative, no distress, appears stated age  Pelvic: External genitalia normal, Vagina normal with slight white discharge, cervix normal in appearance, no CMT, rectovaginal septum normal     Wet prep: positive clue cells, no yeast, no trich, many WBCs, positive whiff    Assessment/Plan:     Recurrent BV. Rx PO Flagyl and weekly Metrogel x 6 months. Follow up prn. Plan of care discussed. Patient expressed understanding.

## 2018-07-19 ENCOUNTER — OFFICE VISIT (OUTPATIENT)
Dept: OBGYN CLINIC | Age: 23
End: 2018-07-19

## 2018-07-19 VITALS
BODY MASS INDEX: 24.92 KG/M2 | HEIGHT: 64 IN | RESPIRATION RATE: 18 BRPM | SYSTOLIC BLOOD PRESSURE: 114 MMHG | OXYGEN SATURATION: 99 % | WEIGHT: 146 LBS | HEART RATE: 82 BPM | DIASTOLIC BLOOD PRESSURE: 80 MMHG

## 2018-07-19 DIAGNOSIS — N89.8 VAGINAL ITCHING: Primary | ICD-10-CM

## 2018-07-19 DIAGNOSIS — N92.6 IRREGULAR MENSES: ICD-10-CM

## 2018-07-19 LAB — WET MOUNT POCT, WMPOCT: NORMAL

## 2018-07-19 NOTE — PROGRESS NOTES
Subjective:      Patient states that her most recent period was prolonged and lasted 2 weeks, though it wasn't heavy during that time. She says that she began having vaginal itching after the completion of bleeding, but denies any abnormal discharge. She has been using weekly Metrogel for recurrent BV and states that she has gotten the dose for this week. Current Outpatient Prescriptions   Medication Sig Dispense Refill    metroNIDAZOLE (METROGEL) 0.75 % vaginal gel Insert one applicator into vagina once weekly for 6 months. 187.5 mg 3    norgestimate-ethinyl estradiol (TRINESSA, 28,) 0.18/0.215/0.25 mg-35 mcg (28) tab Take 1 Tab by mouth daily. Indications: Pregnancy Contraception 1 Package 12    albuterol (PROVENTIL HFA, VENTOLIN HFA, PROAIR HFA) 90 mcg/actuation inhaler Take 1 Puff by inhalation every four (4) hours as needed for Wheezing. 1 Inhaler 2     Allergies   Allergen Reactions    Other Food Angioedema     mesquite     Past Medical History:   Diagnosis Date    Asthma      History reviewed. No pertinent surgical history. Family History   Problem Relation Age of Onset    Hypertension Mother     High Cholesterol Father     Diabetes Maternal Grandmother     Cancer Other 80     ovarian and breast     Social History   Substance Use Topics    Smoking status: Never Smoker    Smokeless tobacco: Never Used    Alcohol use 3.6 oz/week     3 Glasses of wine, 3 Shots of liquor per week      Review of Systems    A comprehensive review of systems was negative except for that written in the HPI.        Objective:     Visit Vitals    /80    Pulse 82    Resp 18    Ht 5' 4\" (1.626 m)    Wt 146 lb (66.2 kg)    LMP 07/03/2018    SpO2 99%    BMI 25.06 kg/m2     General appearance: alert, cooperative, no distress, appears stated age  Pelvic: External genitalia normal, Vagina normal with physiologic discharge, cervix normal in appearance, no CMT, rectovaginal septum normal     Wet prep: no clue cells, no yeast, no trich, minimal WBCs    Assessment/Plan:     Vaginal itching, no evidence of infection. Patient reassured, most likely itching is due to prolonged exposure to blood. Metrorrhagia on tricyclic OCPs. Discussed with the patient that if the problem continues we can change to a monophasic pill. Follow up prn. Plan of care discussed. Patient expressed understanding.

## 2019-09-24 PROBLEM — Z23 ENCOUNTER FOR IMMUNIZATION: Status: RESOLVED | Noted: 2017-11-13 | Resolved: 2019-09-24
